# Patient Record
(demographics unavailable — no encounter records)

---

## 2024-10-23 NOTE — ASSESSMENT
[FreeTextEntry1] : This 61-year-old man, with nonhealing surgical wound after ankle surgery, complicated postsurgery.  Surgery happened on September 11, 2024, sutures removed on or around October 1, 2024.  Currently he still has an ulcer, but this was after a recent scab removal.  Skin around the ulcer is erythematous and tender.  This slight wetness to the base. I am concerned about possible secondary infection, Pseudomonas is of concern.  Will check a wound culture to rule out pseudomonal infection.  I have added empirically a course of Levaquin, p.o., to be taken with the current IV antibiotics.  He understands.  Will call the patient once results have been obtained from the culture.  He understands.  I have also emphasized on cigarette smoking.  I asked the patient, to stop smoking for the next 2 weeks to allow for more effective blood flow down to his extremities.  He agrees with a trial.

## 2024-10-23 NOTE — PHYSICAL EXAM
[General Appearance - Alert] : alert [General Appearance - In No Acute Distress] : in no acute distress [Sclera] : the sclera and conjunctiva were normal [PERRL With Normal Accommodation] : pupils were equal in size, round, reactive to light [Extraocular Movements] : extraocular movements were intact [Outer Ear] : the ears and nose were normal in appearance [Oropharynx] : the oropharynx was normal with no thrush [Neck Appearance] : the appearance of the neck was normal [Neck Cervical Mass (___cm)] : no neck mass was observed [Jugular Venous Distention Increased] : there was no jugular-venous distention [Thyroid Diffuse Enlargement] : the thyroid was not enlarged [Auscultation Breath Sounds / Voice Sounds] : lungs were clear to auscultation bilaterally [Heart Rate And Rhythm] : heart rate was normal and rhythm regular [Heart Sounds] : normal S1 and S2 [Heart Sounds Gallop] : no gallops [Murmurs] : no murmurs [Heart Sounds Pericardial Friction Rub] : no pericardial rub [Full Pulse] : the pedal pulses are present [Edema] : there was no peripheral edema [Bowel Sounds] : normal bowel sounds [Abdomen Soft] : soft [Abdomen Tenderness] : non-tender [Abdomen Mass (___ Cm)] : no abdominal mass palpated [Costovertebral Angle Tenderness] : no CVA tenderness [No Palpable Adenopathy] : no palpable adenopathy [Musculoskeletal - Swelling] : no joint swelling [Nail Clubbing] : no clubbing  or cyanosis of the fingernails [Motor Tone] : muscle strength and tone were normal [Skin Color & Pigmentation] : normal skin color and pigmentation [] : no rash [FreeTextEntry1] : lateral left malleolus with ulcer measuring 3 x4  cm, base appears with slight fibrinous debris, slight erythema and tenderness around this [Cranial Nerves] : cranial nerves 2-12 were intact [Sensation] : the sensory exam was normal to light touch and pinprick [No Focal Deficits] : no focal deficits [Oriented To Time, Place, And Person] : oriented to person, place, and time [Affect] : the affect was normal

## 2024-10-23 NOTE — HISTORY OF PRESENT ILLNESS
[FreeTextEntry1] : 60 y/o male with PMHx of Hep C, GERD, active smoker. On 9/11 patient underwent a fixation of Left ankle wound that occured in 5/2024. Pt had left ankle peroneal tendon injury tear with disolocation, lateral ankle ligament rupture, deltoid ligament injury, posterior tibial tendon repair. Pt was finally agreeable to stop smoking for operative procedure and underwent surgery on 9/11 with Dr. Blake MA. Patient reported that post operatively he had a small local incision that never full closed. Pt now presented with dehiscence of wound with diffuse swelling and erythema.   ED course: tachycardia 128, RR 24, WBC 17.99, Lactate 2.1. Started on ceftriaxone 2g daily and vancomycin 1g daily.   suture removal 10/1/24 - per patient, at that time, the area appear infected.   sepsis criteria met on admission WBC 17.99, Lactate 2.1, tachy 128, RR 24 XR Foot: Again noted small fracture of left medial malleolar tip. There is persistent ankle swelling.  CXR: Chest unremarkable Duplex U/S: No calf vein thrombosis is detected. Likely reactive lymph nodes in the Lt groin.   MRSA nares negative    WBC elevation was reactive, and had normalized in the hosptial His blood cultures were negative.   he was sent home on:  Rocephin 2g daily,  Vancomycin 1g Q12H Will plan for 4 weeks of IV antibiotics, he will need a PICC line. The end date is around November 3, 2024.  ENCOURAGED Smoking cessation at the hospital.   He was last seen on 10/7/2024. Blood cultures from the last admission was negative.  Since his hospital visit, patient had follow-up with his surgeon, who ripped off his scab.  Patient reports that the foot looks angry and more tender and more sore.  He has no fevers.  He is concerned.  Since his hospital discharge, he has not been able to stop smoking.  He still smoking half pack per day.

## 2024-10-23 NOTE — DATA REVIEWED
[FreeTextEntry1] : Labs that was scanned in from [facility], was reviewed on the EMR, most recent labs from October 21, 2021 for, show a white blood cell count of 8.63 hemoglobin 12.9 platelet count of 333.  His ESR is 11, the comprehensive metabolic panel showed that he has a normal creatinine of 0.86, glucose was 69.  His liver enzymes are in normal range.  Vanco level trough was listed at 7.4.  C-reactive protein was <3

## 2024-10-23 NOTE — HISTORY OF PRESENT ILLNESS
[FreeTextEntry1] : 62 y/o male with PMHx of Hep C, GERD, active smoker. On 9/11 patient underwent a fixation of Left ankle wound that occured in 5/2024. Pt had left ankle peroneal tendon injury tear with disolocation, lateral ankle ligament rupture, deltoid ligament injury, posterior tibial tendon repair. Pt was finally agreeable to stop smoking for operative procedure and underwent surgery on 9/11 with Dr. Blake MA. Patient reported that post operatively he had a small local incision that never full closed. Pt now presented with dehiscence of wound with diffuse swelling and erythema.   ED course: tachycardia 128, RR 24, WBC 17.99, Lactate 2.1. Started on ceftriaxone 2g daily and vancomycin 1g daily.   suture removal 10/1/24 - per patient, at that time, the area appear infected.   sepsis criteria met on admission WBC 17.99, Lactate 2.1, tachy 128, RR 24 XR Foot: Again noted small fracture of left medial malleolar tip. There is persistent ankle swelling.  CXR: Chest unremarkable Duplex U/S: No calf vein thrombosis is detected. Likely reactive lymph nodes in the Lt groin.   MRSA nares negative    WBC elevation was reactive, and had normalized in the hosptial His blood cultures were negative.   he was sent home on:  Rocephin 2g daily,  Vancomycin 1g Q12H Will plan for 4 weeks of IV antibiotics, he will need a PICC line. The end date is around November 3, 2024.  ENCOURAGED Smoking cessation at the hospital.   He was last seen on 10/7/2024. Blood cultures from the last admission was negative.  Since his hospital visit, patient had follow-up with his surgeon, who ripped off his scab.  Patient reports that the foot looks angry and more tender and more sore.  He has no fevers.  He is concerned.  Since his hospital discharge, he has not been able to stop smoking.  He still smoking half pack per day.

## 2024-10-23 NOTE — DATA REVIEWED
[FreeTextEntry1] : Labs that was scanned in from [facility] [employer] [facility] [employer], was reviewed on the EMR, most recent labs from October 21, 2021 for, show a white blood cell count of 8.63 hemoglobin 12.9 platelet count of 333.  His ESR is 11, the comprehensive metabolic panel showed that he has a normal creatinine of 0.86, glucose was 69.  His liver enzymes are in normal range.  Vanco level trough was listed at 7.4.  C-reactive protein was <3

## 2024-10-23 NOTE — DATA REVIEWED
[FreeTextEntry1] : Labs that was scanned in from [facility], was reviewed on the EMR, most recent labs from October 21, 2021 for, show a white blood cell count of 8.63 hemoglobin 12.9 platelet count of 333.  His ESR is 11, the comprehensive metabolic panel showed that he has a normal creatinine of 0.86, glucose was 69.  His liver enzymes are in normal range.  Vanco level trough was listed at 7.4.  C-reactive protein was <3